# Patient Record
Sex: MALE | Race: WHITE | URBAN - METROPOLITAN AREA
[De-identification: names, ages, dates, MRNs, and addresses within clinical notes are randomized per-mention and may not be internally consistent; named-entity substitution may affect disease eponyms.]

---

## 2021-03-08 ENCOUNTER — TELEPHONE (OUTPATIENT)
Dept: PULMONOLOGY | Facility: CLINIC | Age: 76
End: 2021-03-08

## 2021-03-08 NOTE — TELEPHONE ENCOUNTER
Cardiac rehab referral requested from [facility] for Dr. Cintron P: [phone] F: [phone]    Chart reviewed - Dr. Cintron has never seen this patient and the pt has no future consultations scheduled.     Forms faxed back to the [facility] informing them of this information. Requested forms be sent to patient's PCP or referring MD.      Marcellus Kirby LPN  Pulmonary Medicine:  [facility]  Phone: [phone] Fax: [phone]

## 2025-07-08 ENCOUNTER — THERAPY VISIT (OUTPATIENT)
Dept: OCCUPATIONAL THERAPY | Facility: CLINIC | Age: 80
End: 2025-07-08
Payer: MEDICARE

## 2025-07-08 DIAGNOSIS — F02.80 ALZHEIMER'S DISEASE (H): Primary | ICD-10-CM

## 2025-07-08 DIAGNOSIS — G30.9 ALZHEIMER'S DISEASE (H): Primary | ICD-10-CM

## 2025-07-08 PROCEDURE — 97165 OT EVAL LOW COMPLEX 30 MIN: CPT | Mod: GO

## 2025-07-08 PROCEDURE — 97535 SELF CARE MNGMENT TRAINING: CPT | Mod: GO

## 2025-07-08 NOTE — PROGRESS NOTES
OCCUPATIONAL THERAPY EVALUATION  Type of Visit: Evaluation       Fall Risk Screen:  Have you fallen 2 or more times in the past year?: Yes  Have you fallen and had an injury in the past year?: No      Subjective        Presenting condition or subjective complaint: memory loss and alzhemees  Date of onset: 06/27/25    Relevant medical history:     Dates & types of surgery: stent 2020    Prior diagnostic imaging/testing results: MRI     Prior therapy history for the same diagnosis, illness or injury: No      Occupational Profile: Patient is a pleasant 80 year old male with a diagnosis of Alzheimer's who was referred to outpatient occupational therapy for family/caregiver guidelines to allow maximal independence  in least restrictive environment. Patient presented to clinic accompanied by his daughter, Debra, who was present and contributed to history.  Junior lives with his wife, Jessica, who has been required to provide increased support due to cognitive changes. Junior has good support from family, including children and grandchildren who live nearby. He receives 24/7 supervision. Debra has a goal of learning strategies to facilitate increased engagement and independence in daily and leisure activities. The goal is to remain in his home. Debra states Junior has required increased prompting/assistance for ADL's including picking out clothing, sequencing steps of dressing, grooming/hygiene tasks, and showering tasks. She endorses her mother is becoming frustrated  with level of care/supports required to care for Junior and she would like to learn strategies.  Junior enjoys music and his children often set him up to listen to pandora. He is described as being in good physical shape and enjoys going on walks with family. He receives assistance for all IADL's (cooking, cleaning/laundry, finances, medications, driving).     Prior Level of Function  Transfers: Independent  Ambulation: Independent  ADL:  Independent  IADL: family has been providing increased assistance over the past ~2 years due to cognitive changes    Living Environment  Social support: With a significant other or spouse   Type of home: House; 1 level   Stairs to enter the home: Yes 5 Is there a railing: Yes     Ramp: No   Stairs inside the home: No       Help at home: Home management tasks (cooking, cleaning); Medication and/or finances; Home and Yard maintenance tasks; Assist for driving and community activities  Equipment owned:       Employment: No    Hobbies/Interests: walking listening to music watching grandkids sports    Patient goals for therapy: safe home activities    Pain assessment: Pain denied     Objective     Cognitive Status Examination  Orientation: Person, not oriented to date. States he lives with his parents/grandparents but when prompted he lives with his wife, he acknowledges   Level of Consciousness: Alert  Follows Commands and Answers Questions: Follows single step instructions  Personal Safety and Judgement: Impaired  Memory: Impaired  Attention: Alternating attention impaired, difficulty shifting between tasks, Divided attention impaired, difficulty with simultaneous tasks  Organization/Problem Solving: Sequencing impaired, Problem solving impaired, Prioritizing of info/tasks impaired  Executive Function: Working memory impaired, decreased storage of information for performing tasks, Cognitive flexibility impaired, Planning ability impaired; new learning impaired    VISUAL SKILLS  Visual Field: Appears normal  Visual Attention: changes in setting of Alzheimer's       POSTURE: WFL  RANGE OF MOTION: UE AROM WFL  STRENGTH: UE Strength WFL  COORDINATION: WFL  BALANCE: not formally tested. Denies falls    FUNCTIONAL MOBILITY  Assistive Device(s): None  Ambulation: IND    BED MOBILITY: Independent    TRANSFERS: Independent    BATHING: Requires assistance from family for sequencing     UPPER BODY/LOWERBODY DRESSING: Requires  assistance from family for initiating, picking out clothing, and sequencing dressing    TOILETING: Independent    GROOMING: Requires prompts from family to initiate g/h routine, has difficulty sequencing steps of tasks; requiring toothpaste to be applied to toothbrush and prompts to brush teeth    EATING/SELF FEEDING: Set-Up Required His wife sets out snacks and he is able to retreive snacks available for him      INSTRUMENTAL ACTIVITIES OF DAILY LIVING (IADL):   Patient receives 24/7 supervision from family. On Tuesdays, Jessica goes to play cards for 4 hours and family comes to supervise  Meal Planning/Prep: Jessica manages meal planning and prep. Junior is able to retrieve snacks that are set out for him   Home/Financial Management: Jessica manages household tasks and financial tasks. He requires direct verbal cues for taking out garbage, sweeping, putting items away  Leisure: He loves music and listens on pandora and, listening to books. TV - needs to be set up  Community Mobility: Pt does not drive. He is not wandering. He only walks with his family     Assessment & Plan   CLINICAL IMPRESSIONS  Medical Diagnosis: Moderate late onset Alzheimer's dementia without behavioral disturbance, psychotic disturbance, mood disturbance, or anxiety (H) (G30.1, F02.B0)  - Primary    Treatment Diagnosis: cognitive change    Impression/Assessment:Patient is a pleasant 80 year old male with a diagnosis of Alzheimer's who was referred to outpatient occupational therapy for family/caregiver guidelines to allow maximal independence  in least restrictive environment.  The following significant findings have been identified: Impaired cognition.  These identified deficits interfere with their ability to perform self care tasks and leisure tasks as compared to previous level of function.     Clinical Decision Making (Complexity):  Assessment of Occupational Performance: 1-3 Performance Deficits  Occupational Performance Limitations:  bathing/showering, dressing, hygiene and grooming, home establishment and management, meal preparation and cleanup, and leisure activities  Clinical Decision Making (Complexity): Low complexity    PLAN OF CARE  Treatment Interventions:  Interventions: Self-Care/Home Management, Therapeutic Activity, Standardized Testing    Long Term Goals   OT Goal 1  Goal Identifier: CPT  Goal Description: Patient and family to verbalize understanding of Cognitive Performance Test results and recommendations to guide levels of support for activities of varying complexity to improve client participation and quality of life and minimize functional impact of cognitive changes  Target Date: 08/04/25  OT Goal 2  Goal Identifier: Caregiver strategies  Goal Description: The family/caregivers will verbalize understanding of strategies to compensate for reduced cognitive performance in order to improve independence and safety with ADL and remain in the least restrictive environment. Strategies include, but not limited to  sequencing dressing/showers, engagement in leisure activities, etc  Target Date: 08/04/25      Frequency of Treatment: 1x/week  Duration of Treatment: 30 days     Recommended Referrals to Other Professionals: none   Education Assessment: Learner/Method: Patient;Listening;Demonstration     Risks and benefits of evaluation/treatment have been explained.   Patient/Family/caregiver agrees with Plan of Care.     Evaluation Time:    OT Eval, Low Complexity Minutes (40540): 20    Signing Clinician: BECKY Ceballos/L, CNS, CSRS        [facility]                                                                                   OUTPATIENT OCCUPATIONAL THERAPY      PLAN OF TREATMENT FOR OUTPATIENT REHABILITATION   Patient's Last Name, First Name, Junior Chappell YOB: 1945   Provider's Name   [facility]   Medical Record No.  [id number]     Onset  Date: 06/27/25 Start of Care Date: 07/08/25     Medical Diagnosis:  Moderate late onset Alzheimer's dementia without behavioral disturbance, psychotic disturbance, mood disturbance, or anxiety (H) (G30.1, F02.B0)  - Primary      OT Treatment Diagnosis:  cognitive change Plan of Treatment  Frequency/Duration:1x/week/30 days    Certification date from 07/08/25   To 08/04/25        See note for plan of treatment details and functional goals     Kirsten Hannon, OTR/L, CNS, CSRS                         I CERTIFY THE NEED FOR THESE SERVICES FURNISHED UNDER        THIS PLAN OF TREATMENT AND WHILE UNDER MY CARE .             Physician Signature               Date    X_____________________________________________________                  Referring Provider:  Josafat Richter    Initial Assessment  See Epic Evaluation- 07/08/25

## 2025-07-10 PROBLEM — G30.9 ALZHEIMER'S DISEASE (H): Status: ACTIVE | Noted: 2025-07-10

## 2025-07-10 PROBLEM — F02.80 ALZHEIMER'S DISEASE (H): Status: ACTIVE | Noted: 2025-07-10

## 2025-07-17 ENCOUNTER — THERAPY VISIT (OUTPATIENT)
Dept: OCCUPATIONAL THERAPY | Facility: CLINIC | Age: 80
End: 2025-07-17
Payer: MEDICARE

## 2025-07-17 DIAGNOSIS — G30.9 ALZHEIMER'S DISEASE (H): Primary | ICD-10-CM

## 2025-07-17 DIAGNOSIS — F02.80 ALZHEIMER'S DISEASE (H): Primary | ICD-10-CM

## 2025-07-17 PROCEDURE — 96125 COGNITIVE TEST BY HC PRO: CPT | Mod: GO

## 2025-07-17 PROCEDURE — 97535 SELF CARE MNGMENT TRAINING: CPT | Mod: GO

## 2025-07-21 NOTE — PROGRESS NOTES
Cognitive Performance Test    SUMMARY OF TEST:    The Cognitive Performance Test (CPT) is a standardized performance-based assessment to measure working memory/executive function processing capacities that underlie functional performance. Subtasks include common basic and instrumental activities of daily living (ADL/IADL) which are rated based on the manner in which patients respond to task demands of varying complexity. The total CPT score describes a level of functioning that indicates how information is processed, implications for functional activities, potential safety risks and a recommended level of supervision or assist based on cognitive function. The highest total score on this test is in the range of 5.6 to 5.8.    DATE OF TESTIN25    RESULTS OF TESTING:                                                                                         CPT Subtest Results    MEDBOX:  SHOP/GLOVES: 3/6 PHONE:    WASH:  3/5 TRAVEL:  TOAST:    DRESS:    TOTAL CPT SCORE:       Average CPT Score  2.8/5.6    INTERPRETATION OF TEST RESULTS:    Based on the Cognitive Performance Test, this patient scored at CPT Level 3.0 (cut off is 3.5 for recommendations).  See CPT Levels reference below.    Summary of functional cognitive status:   Individuals who score at this level require 24-hour supervision for safety and for assistance with daily tasks. Familiar tasks usually requires set-up of supplies and directions to complete steps.They are aware of concrete task steps but needs prompting or cues to initiate and complete simple tasks. Attention span is limited, simple directions may need to be repeated, and re-focus to a topic or task may be required. They function best with a set schedule in familiar surroundings with familiar people. They require ssistance with medications, and access to medication should be limited. Meals, nutrition and dietary restrictions need to be monitored.  All hazardous  activities should be restricted or supervised. They should not drive. They are prone to wandering and can become lost.    Factors affecting performance:  Impaired vision    Recommendations:    Assist for ADL/IADL:  ADL, Meal preparation, Cleaning, Laundry, Shopping, Finances, Driving, and Medication management  Supervision for ADL/IADL:  leisure tasks (ie: music, going for a walk)  Supervision in living settin hour       Provide structure, routine, and continue to encourage engagement in social and cognitive stimulating activities that Junior enjoys (ie: walking, listening to music, spending time with family)                                               TIME ADMINISTERING TEST: 30    TIME FOR INTERPRETATION AND PREPARATION OF REPORT: 20    TOTAL TIME: 50      CPT Levels Reference:    Patient's Average CPT Score:  3.0                                                                                                                                                  Individual scores range along a continuum as outlined below.  In addition to cognitive status, other factors may affect safety in a home environment.  Please refer to specific recommendations for this patient.    ___5.6-5.8  Normal functioning (absence of cognitive-functional disability).  Independent in managing personal affairs, monitors and directs own behavior.  Uses complex information to carry out daily activities with safety and accuracy.    Proficient with instrumental activities of daily living (IADL) and learning new activity.  Problems are anticipated, errors are avoided, and consequences of actions are considered.      ___5.0   Mild cognitive-functional disability; deficits in working memory and executive thought processes. Difficulty using complex information. Problems may be observed with recent memory, judgment, reasoning and planning ahead. May be impulsive or have difficulty anticipating consequences.  Safety:  May require assistance to  "plan ahead; or to manage complex medication schedules, appointments or finances.  Hazardous activities may need to be monitored or limited.  ADL:  Mild functional decline.  Able to complete basic self-care and routine household tasks.  May have difficulty with complex daily tasks such as reading, writing, meal preparation, shopping or driving.   Learns through hands on teaching. Self-centered behavior or difficulty considering the needs of others may be seen related to trouble seeing the  whole picture\". Can appear disorganized or uninhibited.    ___4.5  Mild to moderate cognitive-functional disability. Significant deficits in working memory and executive thought processes. Judgment, reasoning and planning show obvious impairment.  Distractible with inability to shift attention/actions given competing stimuli.  Difficulty with problem solving and managing details. Complex daily tasks performed with inconsistency, difficulty, or error.     Safety:  Medications should be monitored, stove use may require supervision, and driving ability may be affected.  Impaired safety awareness with inability to anticipate potential problems.  May not recognize or respond to emergent situations. Requires frequent check-in support.   ADL:  Mild difficulty with simple everyday self-care tasks. Benefits from structured, routine activity.  Will likely need reminders to complete tasks outside of the routine. Requires assistance with planning and IADL tasks like shopping and finances. Learns concrete tasks through repetition, but performance may not generalize. Tends to be impulsive with poor insight. Self centered behavior or inability to consider the needs of others is common.    ___4.0  Moderate cognitive-functional disability; abstract to concrete thought processes. Working memory and executive function impairments are obvious. Difficulty with planning and problem solving.  Behavior is goal-directed, but unable to follow multi-step " directions, is easily distracted, and may not recognize mistakes.  Inability to anticipate hazards or understand precautions.  Safety:  Recommend 24-hour supervision for safety. Supervision needed for medication management and for hazardous activities. May not be able to follow a restricted diet. Can get lost in unfamiliar surroundings. Generally, persons functioning at level 4 should not be driving.   ADL:  Some decline in quality or frequency of ADL.  [place] enhanced by use of a routine, simple concrete directions, and caregiver set-up of needed items. Complex tasks such as money or home management typically requires assistance.  Relies heavily on vision to guide behavior; will ignore objects/hazards not in plain sight and can be distracted by irrelevant objects. Often has poor insight.  Able to carry out social conversation and may verbally  cover  for deficits leading caregivers to believe they are capable of functioning independently.       _x__3.5  Moderate cognitive-functional disability; increased cues needed for task completion. Aware of concrete task steps but needs prompting or cues to initiate and complete simple tasks. Attention span is limited, simple directions may need to be repeated, and re-focus to a topic or task may be required.  Safety:  24-hour supervision required for safety and for assistance with daily tasks. Assistance required with medications, and access to medication should be limited. Meals, nutrition and dietary restrictions need to be monitored.  All hazardous activities should be restricted or supervised. Should not drive. Prone to wandering and can become lost.  ADL:  Moderate functional decline. Familiar tasks usually requires set-up of supplies and directions to complete steps. May need objects handed to them for task initiation. Function best with a set schedule in familiar surroundings with familiar people. All complex tasks must be done by others. Vocabulary is diminished  and speech often unfocused.

## 2025-07-31 ENCOUNTER — THERAPY VISIT (OUTPATIENT)
Dept: OCCUPATIONAL THERAPY | Facility: CLINIC | Age: 80
End: 2025-07-31
Payer: MEDICARE

## 2025-07-31 DIAGNOSIS — F02.80 ALZHEIMER'S DISEASE (H): Primary | ICD-10-CM

## 2025-07-31 DIAGNOSIS — G30.9 ALZHEIMER'S DISEASE (H): Primary | ICD-10-CM

## 2025-07-31 PROCEDURE — 97530 THERAPEUTIC ACTIVITIES: CPT | Mod: GO

## 2025-08-05 ENCOUNTER — THERAPY VISIT (OUTPATIENT)
Dept: SPEECH THERAPY | Facility: CLINIC | Age: 80
End: 2025-08-05
Payer: MEDICARE

## 2025-08-05 DIAGNOSIS — G30.1: Primary | ICD-10-CM

## 2025-08-05 DIAGNOSIS — R47.01 APHASIA: ICD-10-CM

## 2025-08-05 DIAGNOSIS — F02.B0: Primary | ICD-10-CM

## 2025-08-05 PROCEDURE — 92523 SPEECH SOUND LANG COMPREHEN: CPT | Mod: GN,52 | Performed by: SPEECH-LANGUAGE PATHOLOGIST
